# Patient Record
Sex: FEMALE | URBAN - METROPOLITAN AREA
[De-identification: names, ages, dates, MRNs, and addresses within clinical notes are randomized per-mention and may not be internally consistent; named-entity substitution may affect disease eponyms.]

---

## 2024-04-12 ENCOUNTER — APPOINTMENT (OUTPATIENT)
Dept: URBAN - METROPOLITAN AREA CLINIC 245 | Age: 59
Setting detail: DERMATOLOGY
End: 2024-04-12

## 2024-04-12 DIAGNOSIS — D49.2 NEOPLASM OF UNSPECIFIED BEHAVIOR OF BONE, SOFT TISSUE, AND SKIN: ICD-10-CM

## 2024-04-12 PROCEDURE — OTHER DEFER: OTHER

## 2024-04-12 PROCEDURE — 99202 OFFICE O/P NEW SF 15 MIN: CPT

## 2024-04-12 PROCEDURE — OTHER COUNSELING: OTHER

## 2024-04-12 ASSESSMENT — LOCATION DETAILED DESCRIPTION DERM: LOCATION DETAILED: RIGHT DISTAL PRETIBIAL REGION

## 2024-04-12 ASSESSMENT — LOCATION SIMPLE DESCRIPTION DERM: LOCATION SIMPLE: RIGHT PRETIBIAL REGION

## 2024-04-12 ASSESSMENT — LOCATION ZONE DERM: LOCATION ZONE: LEG

## 2024-04-12 NOTE — PROCEDURE: DEFER
Instructions (Optional): .\\n- Pt declined biopsy/culture today due to extreme fear of needles. \\n- Pt requested for removal/testing to \"be put to sleep\". Recommended a general surgeon for E&M.\\n\\n-I discussed with the patient, and the patient's  why we are recommending a biopsy/tissue culture with the way the lesion presents to me today for a definitive diagnosis. We discussed how this could be a benign growth, but this could also represent a malignant lesion, or an infectious process.  Per patient it has been present for 2 + years, growing over time and it is not healing. PCP has also recommended she seek treatment with dermatology. \\n\\n-Patient and  understand that the longer they delay diagnosis/treatment, they risk this lesion being more difficulty to treat, cure, and the risk of severe cosmetic deformation.  Patient still declined any interventions today.
Size Of Lesion In Cm (Optional): 0
Detail Level: Detailed
Introduction Text (Please End With A Colon): The following procedure was deferred: